# Patient Record
Sex: MALE | Race: WHITE | Employment: FULL TIME | URBAN - METROPOLITAN AREA
[De-identification: names, ages, dates, MRNs, and addresses within clinical notes are randomized per-mention and may not be internally consistent; named-entity substitution may affect disease eponyms.]

---

## 2017-12-28 ENCOUNTER — OFFICE VISIT (OUTPATIENT)
Dept: INTERNAL MEDICINE CLINIC | Age: 37
End: 2017-12-28

## 2017-12-28 VITALS
HEART RATE: 72 BPM | HEIGHT: 71 IN | DIASTOLIC BLOOD PRESSURE: 78 MMHG | WEIGHT: 194 LBS | OXYGEN SATURATION: 99 % | BODY MASS INDEX: 27.16 KG/M2 | SYSTOLIC BLOOD PRESSURE: 120 MMHG | RESPIRATION RATE: 16 BRPM

## 2017-12-28 DIAGNOSIS — Z00.00 PREVENTATIVE HEALTH CARE: Primary | ICD-10-CM

## 2017-12-28 DIAGNOSIS — E78.00 HYPERCHOLESTEREMIA: ICD-10-CM

## 2017-12-28 DIAGNOSIS — Z23 NEED FOR DIPHTHERIA-TETANUS-PERTUSSIS (TDAP) VACCINE: ICD-10-CM

## 2017-12-28 DIAGNOSIS — G56.03 CARPAL TUNNEL SYNDROME, BILATERAL: ICD-10-CM

## 2017-12-28 PROCEDURE — 90715 TDAP VACCINE 7 YRS/> IM: CPT | Performed by: INTERNAL MEDICINE

## 2017-12-28 PROCEDURE — 90471 IMMUNIZATION ADMIN: CPT | Performed by: INTERNAL MEDICINE

## 2017-12-28 PROCEDURE — 99385 PREV VISIT NEW AGE 18-39: CPT | Performed by: INTERNAL MEDICINE

## 2017-12-28 ASSESSMENT — PATIENT HEALTH QUESTIONNAIRE - PHQ9
1. LITTLE INTEREST OR PLEASURE IN DOING THINGS: 0
SUM OF ALL RESPONSES TO PHQ9 QUESTIONS 1 & 2: 0
2. FEELING DOWN, DEPRESSED OR HOPELESS: 0
SUM OF ALL RESPONSES TO PHQ QUESTIONS 1-9: 0

## 2017-12-28 NOTE — PROGRESS NOTES
Kulwinder Rangel  1980 12/28/17    SUBJECTIVE:    Pt with R>L paresthesias at night, wakes the pt at night. Located from the elbow down. He occasionally has some symptoms during the day. This seems to involve his entire hand. He denies any weakness, difficulties with function, though rarely with grasping he will have brief dysesthesias in the 3rd finger. He is using a cock-up wrist splint with significant improvement. Symptoms have been present for a couple of months. High chol - found to have high LDL 12/16. He has not been on meds. OBJECTIVE:    /78   Pulse 72   Resp 16   Ht 5' 11\" (1.803 m)   Wt 194 lb (88 kg) Comment: 185 at home  SpO2 99%   BMI 27.06 kg/m²     Physical Exam   Constitutional: He is oriented to person, place, and time. He appears well-developed and well-nourished. HENT:   Right Ear: External ear normal.   Left Ear: External ear normal.   Mouth/Throat: Oropharynx is clear and moist.   Eyes: Conjunctivae and EOM are normal. Pupils are equal, round, and reactive to light. No scleral icterus. Neck: Neck supple. No JVD present. No tracheal deviation present. No thyromegaly present. Cardiovascular: Normal rate, regular rhythm, normal heart sounds and intact distal pulses. Pulmonary/Chest: Effort normal and breath sounds normal. No respiratory distress. Abdominal: Soft. He exhibits no distension and no mass. There is no hepatosplenomegaly. There is no tenderness. There is no rebound, no guarding and no CVA tenderness. Musculoskeletal: He exhibits no edema. Lymphadenopathy:     He has no cervical adenopathy. Neurological: He is alert and oriented to person, place, and time. He has normal strength. No cranial nerve deficit or sensory deficit. Reflex Scores:       Bicep reflexes are 2+ on the right side and 2+ on the left side. Patellar reflexes are 2+ on the right side and 2+ on the left side. Nonfocal   Skin: No cyanosis. Nails show no clubbing. Psychiatric: He has a normal mood and affect. His behavior is normal. Judgment normal.   very mild atrophy thenar eminence; (-) tinnel's sign      ASSESSMENT:    1. Preventative health care    2. Hypercholesteremia    3. Carpal tunnel syndrome, bilateral    4. Need for diphtheria-tetanus-pertussis (Tdap) vaccine        PLAN:    Vicente Lomas was seen today for establish care. Diagnoses and all orders for this visit:    Preventative health care - BP at goal; check labs; tdap today; declines flu shot; encourage pt to resume exercise  -     Comprehensive Metabolic Panel; Future  -     Lipid Panel; Future  -     HIV Screen    Hypercholesteremia - check labs  -     Comprehensive Metabolic Panel; Future  -     Lipid Panel; Future    Carpal tunnel syndrome, bilateral - will use wrist splints diligently for the next month. If symptoms are not markedly improved will need an EMG and referral to hand surgery  -     TSH with Reflex;  Future    Need for diphtheria-tetanus-pertussis (Tdap) vaccine  -     Tdap (age 6y and older) IM ([address])

## 2018-02-12 ENCOUNTER — OFFICE VISIT (OUTPATIENT)
Dept: INTERNAL MEDICINE CLINIC | Age: 38
End: 2018-02-12

## 2018-02-12 VITALS
WEIGHT: 202 LBS | RESPIRATION RATE: 16 BRPM | HEIGHT: 71 IN | SYSTOLIC BLOOD PRESSURE: 122 MMHG | BODY MASS INDEX: 28.28 KG/M2 | OXYGEN SATURATION: 98 % | HEART RATE: 84 BPM | DIASTOLIC BLOOD PRESSURE: 76 MMHG

## 2018-02-12 DIAGNOSIS — Z00.00 PREVENTATIVE HEALTH CARE: ICD-10-CM

## 2018-02-12 DIAGNOSIS — R20.2 PARESTHESIAS: Primary | ICD-10-CM

## 2018-02-12 LAB
A/G RATIO: 1.9 (ref 1.1–2.2)
ALBUMIN SERPL-MCNC: 4.8 G/DL (ref 3.4–5)
ALP BLD-CCNC: 73 U/L (ref 40–129)
ALT SERPL-CCNC: 30 U/L (ref 10–40)
ANION GAP SERPL CALCULATED.3IONS-SCNC: 12 MMOL/L (ref 3–16)
AST SERPL-CCNC: 25 U/L (ref 15–37)
BILIRUB SERPL-MCNC: 0.6 MG/DL (ref 0–1)
BUN BLDV-MCNC: 14 MG/DL (ref 7–20)
CALCIUM SERPL-MCNC: 9.7 MG/DL (ref 8.3–10.6)
CHLORIDE BLD-SCNC: 97 MMOL/L (ref 99–110)
CHOLESTEROL, TOTAL: 268 MG/DL (ref 0–199)
CO2: 29 MMOL/L (ref 21–32)
CREAT SERPL-MCNC: 0.9 MG/DL (ref 0.9–1.3)
GFR AFRICAN AMERICAN: >60
GFR NON-AFRICAN AMERICAN: >60
GLOBULIN: 2.5 G/DL
GLUCOSE BLD-MCNC: 108 MG/DL (ref 70–99)
HDLC SERPL-MCNC: 52 MG/DL (ref 40–60)
LDL CHOLESTEROL CALCULATED: 199 MG/DL
POTASSIUM SERPL-SCNC: 4.8 MMOL/L (ref 3.5–5.1)
SODIUM BLD-SCNC: 138 MMOL/L (ref 136–145)
TOTAL PROTEIN: 7.3 G/DL (ref 6.4–8.2)
TRIGL SERPL-MCNC: 86 MG/DL (ref 0–150)
TSH REFLEX: 2.46 UIU/ML (ref 0.27–4.2)
VLDLC SERPL CALC-MCNC: 17 MG/DL

## 2018-02-12 PROCEDURE — 99213 OFFICE O/P EST LOW 20 MIN: CPT | Performed by: INTERNAL MEDICINE

## 2018-02-12 NOTE — PROGRESS NOTES
Bartholomew Babinski  1980 02/12/18    SUBJECTIVE:    Pt complains of paresthesias at night, wakes the pt at night. Located from the elbow down. He occasionally has some symptoms during the day. This seems to involve his entire hand. He denies any weakness, difficulties with function, though rarely with grasping he will have brief dysesthesias in the 3rd finger. He is using a cock-up wrist splint with modest improvement. Symptoms have been present for a couple of months. Symptoms are worse in the right than the left charlie. OBJECTIVE:    /76   Pulse 84   Resp 16   Ht 5' 11\" (1.803 m)   Wt 202 lb (91.6 kg)   SpO2 98%   BMI 28.17 kg/m²     Physical Exam    ASSESSMENT:    1. Paresthesias    2. Preventative health care        PLAN:    Darian Morales was seen today for follow-up. Diagnoses and all orders for this visit:    Paresthesias - concerning for bilat carpal tunnel syndrome, and pt continues to have symptoms despite wrist splints. I will ask Dr Christie Worrell to see the pt but bilat upper extremity EMG, and I will send this note to his office. If abnl, I will refer to hand surgery.  [facility]- Fred Jett MD    Preventative health care  -     Comprehensive Metabolic Panel; Future  -     Lipid Panel; Future  -     TSH with Reflex;  Future  -     HIV Screen

## 2018-02-13 LAB
HIV AG/AB: NORMAL
HIV ANTIGEN: NORMAL
HIV-1 ANTIBODY: NORMAL
HIV-2 AB: NORMAL

## 2018-03-07 ENCOUNTER — TELEPHONE (OUTPATIENT)
Dept: INTERNAL MEDICINE CLINIC | Age: 38
End: 2018-03-07

## 2018-03-07 ENCOUNTER — OFFICE VISIT (OUTPATIENT)
Dept: PHYSICAL MEDICINE AND REHAB | Age: 38
End: 2018-03-07

## 2018-03-07 DIAGNOSIS — M79.602 PARESTHESIA AND PAIN OF BOTH UPPER EXTREMITIES: ICD-10-CM

## 2018-03-07 DIAGNOSIS — M79.601 PARESTHESIA AND PAIN OF BOTH UPPER EXTREMITIES: ICD-10-CM

## 2018-03-07 DIAGNOSIS — G56.03 BILATERAL CARPAL TUNNEL SYNDROME: Primary | ICD-10-CM

## 2018-03-07 DIAGNOSIS — G56.22 ULNAR NEUROPATHY AT WRIST, LEFT: ICD-10-CM

## 2018-03-07 DIAGNOSIS — R20.2 PARESTHESIA AND PAIN OF BOTH UPPER EXTREMITIES: ICD-10-CM

## 2018-03-07 PROCEDURE — 95886 MUSC TEST DONE W/N TEST COMP: CPT | Performed by: PHYSICAL MEDICINE & REHABILITATION

## 2018-03-07 PROCEDURE — 95911 NRV CNDJ TEST 9-10 STUDIES: CPT | Performed by: PHYSICAL MEDICINE & REHABILITATION

## 2018-03-07 NOTE — TELEPHONE ENCOUNTER
----- Message from Matt Escobedo MD sent at 3/7/2018  4:40 PM EST -----      ----- Message -----  From: Erma Johnson MA  Sent: 3/7/2018   3:08 PM  To: Matt Escobedo MD

## 2018-03-07 NOTE — TELEPHONE ENCOUNTER
Spoke with patient regarding referral to Dr. Abe Bosworth. Patient stated his discussion with Dr. Terri Yi today made it seem that this was not that severe. Dr. Terri Yi mentioned splinting because this necessarily is not a nerve issue. Patient mentioned that Dr. Carlos Gonzalez said this could be fixed with steroid injection instead of the possibility of surgery? Patient wants to make sure he knows all his options before surgery is the answer. Is this something that Dr. Abe Bosworth would answer or do you firmly believe that surgery is the next step? Please advise.

## 2018-03-07 NOTE — LETTER
March 7, 2018          Iain Schaefer MD  [address]  [place], [address] [place] [address]        Patient Name: Franky Monson   MRN Number: [id number]   YOB: 1980   Date Of Visit: 3/7/2018        Dear Iain Schaefer MD,     Thank you for referring Franky Monson to me for electrodiagnostic testing. Attached are the findings of the EMG and my assessment. If you have any further questions, please do not hesitate to call me. Thank you for this interesting referral.      Sincerely,          PARAS Hickman MD.

## 2018-03-07 NOTE — PROGRESS NOTES
EMG REPORT     CHIEF COMPLAINT: Right forearm and hand pain with digit paresthesias. HISTORY OF PRESENT ILLNESS: 41 y.o. R hand dominant male with about 6 months of abrupt onset R>L hand and forearm pains with numbness, tingling and some clumsiness. He is often awakened with these symptoms. He denies dropping things or feeling weak on the job. No reported rashes or limb discoloration. No significant neck pain. He rated the pain severity as 4-5/10. No similar LE symptoms. No h/o DM or thyroid disorder. PHYSICAL EXAMINATION: Alert. Free and full Cspine active rotation. Neg Spurling's maneuver. Trace/= UE DTR's. Neg Tinel's. 4+/5 right thumb opposition MMT. Normal perception to light touch throughout. No atrophy, tremor or clonus. NERVE CONDUCTION STUDIES:     MOTOR         LATENCY NORMAL AMPLITUDE DISTANCE COND. JULIANNE. RIGHT  MEDIAN 5.6 < 4.2 msec 4.6 8 cm 55   LEFT  MEDIAN 5.0 < 4.2 msec 2.1 8 cm >50   RIGHT  ULNAR 3.4 < 4.2 msec 4.4 8 cm 60   LEFT  ULNAR 3.0 < 4.2 msec 7.5 8 cm >50      SENSORY  ORTHODROMIC        LATENCY NORMAL AMPLITUDE DISTANCE   RIGHT MEDIAN 4.1 <2.3 msec 7 10 cm   LEFT  MEDIAN 2.9 < 2.3 msec 18 10 cm   RIGHT  ULNAR 2.2 < 2.3 msec 13 10 cm   LEFT  ULNAR 2.6 < 2.3 msec 10 10 cm       Right dorsal ulnar sensory: dL 3.2 msec, amp 12 microV. NEEDLE EMG:      RIGHT   LEFT     Insertional Activity Spontaneous  Activity Volutional  MUAP's Insertional Activity Spontaneous  Activity Volutional  MUAP's   Cerv Parasp Normal None Normal Normal None Normal   Infraspinatus Normal None Normal Normal None Normal   Deltoid   Normal None Normal Normal None Normal   Biceps   Normal None Normal Normal None Normal   Triceps   Normal None Normal Normal None Normal   Pronator Teres Normal None Normal Normal None Normal   Extensor Indices Normal None Normal Normal None Normal   1st Dorsal Interosseus Normal None Normal Normal None Normal   Abductor Pollicis Br.  Increased Occ Dec #, Polys Normal None Polys       FINDINGS:   EMG of the cervical paraspinals and both upper limbs demonstrated no paraspinal muscle membrane irritability. No positive waves or fibrillations were identified in proximal musculature. Minimal positive waves and fibrillations were localized to the APB muscle of the right hand. A diminished number of polyphasic motor units were seen in the R>L APB muscle. The median sensory and motor latencies were delayed across both wrists. There was minor left ulnar sensory latency delay across the wrists as well. Ulnar motor conductions were normal.      IMPRESSION:      1. Abnormal EMG. 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      2. 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     3. No evidence of a concurrent spinal nerve root injury (radiculopathy), plexopathy, generalized peripheral neuropathy or primary muscle disease         Thank you for this interesting referral.

## 2018-03-08 DIAGNOSIS — G56.03 BILATERAL CARPAL TUNNEL SYNDROME: Primary | ICD-10-CM

## 2018-09-11 ENCOUNTER — OFFICE VISIT (OUTPATIENT)
Dept: INTERNAL MEDICINE CLINIC | Age: 38
End: 2018-09-11

## 2018-09-11 VITALS
SYSTOLIC BLOOD PRESSURE: 130 MMHG | BODY MASS INDEX: 28.23 KG/M2 | OXYGEN SATURATION: 96 % | HEART RATE: 82 BPM | WEIGHT: 202.4 LBS | DIASTOLIC BLOOD PRESSURE: 78 MMHG | RESPIRATION RATE: 16 BRPM

## 2018-09-11 DIAGNOSIS — M79.672 LEFT FOOT PAIN: Primary | ICD-10-CM

## 2018-09-11 DIAGNOSIS — M79.89 SWELLING OF LEFT FOOT: ICD-10-CM

## 2018-09-11 PROCEDURE — 96372 THER/PROPH/DIAG INJ SC/IM: CPT | Performed by: INTERNAL MEDICINE

## 2018-09-11 PROCEDURE — 99213 OFFICE O/P EST LOW 20 MIN: CPT | Performed by: INTERNAL MEDICINE

## 2018-09-11 RX ORDER — METHYLPREDNISOLONE ACETATE 80 MG/ML
80 INJECTION, SUSPENSION INTRA-ARTICULAR; INTRALESIONAL; INTRAMUSCULAR; SOFT TISSUE ONCE
Status: COMPLETED | OUTPATIENT
Start: 2018-09-11 | End: 2018-09-11

## 2018-09-11 RX ADMIN — METHYLPREDNISOLONE ACETATE 80 MG: 80 INJECTION, SUSPENSION INTRA-ARTICULAR; INTRALESIONAL; INTRAMUSCULAR; SOFT TISSUE at 16:27

## 2018-09-11 NOTE — LETTER
[facility]  [address]  Phone: [phone]  Fax: [phone]    Eamon Cordero MD        September 11, 2018     Patient: Sammy Harper   YOB: 1980   Date of Visit: 9/11/2018       To Whom It May Concern: It is my medical opinion that Sammy Harper be excused 09/11/18. If you have any questions or concerns, please don't hesitate to call.     Sincerely,        Eamon Cordero MD

## 2018-09-11 NOTE — PROGRESS NOTES
Vish Martinez  1980 09/11/18    SUBJECTIVE:    Pt complains of sudden onset of left foot pain on Sunday. There was no trauma. He developed swelling, erythema, tenderness to heat,     He has used ice, heat with some improvement; he took ibuprofen with modest benefit. OBJECTIVE:    /78   Pulse 82   Resp 16   Wt 202 lb 6.4 oz (91.8 kg)   SpO2 96%   BMI 28.23 kg/m²     Physical Exam  Swelling, erythema, and tenderness in the left 1st TMT joint    ASSESSMENT:    1. Left foot pain    2. Swelling of left foot        PLAN:    Edvin Sheriff was seen today for foot pain. Diagnoses and all orders for this visit:    Left foot pain - I suspect gout, but location is atypical. I will check an xray, give a shot of medrol x1 now. Pt will call tomorrow about response. If not improved will refer to podiatry  -     XR ANKLE LEFT (MIN 3 VIEWS); Future  -     methylPREDNISolone acetate (DEPO-MEDROL) injection 80 mg; Inject 1 mL into the muscle once    Swelling of left foot  -     XR ANKLE LEFT (MIN 3 VIEWS);  Future  -     methylPREDNISolone acetate (DEPO-MEDROL) injection 80 mg; Inject 1 mL into the muscle once

## 2018-09-12 ENCOUNTER — TELEPHONE (OUTPATIENT)
Dept: INTERNAL MEDICINE CLINIC | Age: 38
End: 2018-09-12

## 2018-09-12 ENCOUNTER — HOSPITAL ENCOUNTER (OUTPATIENT)
Dept: GENERAL RADIOLOGY | Age: 38
Discharge: HOME | End: 2018-09-12
Attending: INTERNAL MEDICINE | Admitting: INTERNAL MEDICINE

## 2018-09-12 DIAGNOSIS — M79.672 LEFT FOOT PAIN: ICD-10-CM

## 2018-09-12 DIAGNOSIS — M79.89 SWELLING OF LEFT FOOT: ICD-10-CM

## 2018-09-12 NOTE — TELEPHONE ENCOUNTER
Patient called states he was to let Dr Silvia Melara know how he was doing this am. States foot is much better, tender to touch, some brusing, ROM is better. Patient iced foot for 1 hour twice last evening. Later in the evening patient was walking and felt foot pop. Patient had instant relief. Spoke with Dr Silvia Melara he advised patient to have foot xray done. If okay we can see if foot continues to get better if not we will refer patient to Podiatry. Verbal understanding per patient.

## 2018-11-09 ENCOUNTER — OFFICE VISIT (OUTPATIENT)
Dept: INTERNAL MEDICINE CLINIC | Age: 38
End: 2018-11-09
Payer: COMMERCIAL

## 2018-11-09 VITALS
WEIGHT: 202.2 LBS | OXYGEN SATURATION: 96 % | SYSTOLIC BLOOD PRESSURE: 124 MMHG | HEIGHT: 71 IN | HEART RATE: 101 BPM | DIASTOLIC BLOOD PRESSURE: 80 MMHG | BODY MASS INDEX: 28.31 KG/M2

## 2018-11-09 DIAGNOSIS — J18.9 PNEUMONIA OF LEFT LOWER LOBE DUE TO INFECTIOUS ORGANISM: Primary | ICD-10-CM

## 2018-11-09 PROCEDURE — 99213 OFFICE O/P EST LOW 20 MIN: CPT | Performed by: INTERNAL MEDICINE

## 2018-11-09 RX ORDER — DOXYCYCLINE HYCLATE 100 MG
100 TABLET ORAL 2 TIMES DAILY
Qty: 20 TABLET | Refills: 0 | Status: SHIPPED | OUTPATIENT
Start: 2018-11-09 | End: 2018-11-19

## 2018-11-09 RX ORDER — GUAIFENESIN AND CODEINE PHOSPHATE 100; 10 MG/5ML; MG/5ML
5 SOLUTION ORAL 4 TIMES DAILY PRN
Qty: 120 ML | Refills: 0 | Status: SHIPPED | OUTPATIENT
Start: 2018-11-09 | End: 2018-11-16

## 2018-11-12 DIAGNOSIS — R05.9 COUGH: Primary | ICD-10-CM

## 2018-11-13 ENCOUNTER — TELEPHONE (OUTPATIENT)
Dept: INTERNAL MEDICINE CLINIC | Age: 38
End: 2018-11-13

## 2019-02-07 ENCOUNTER — E-VISIT (OUTPATIENT)
Dept: INTERNAL MEDICINE CLINIC | Age: 39
End: 2019-02-07
Payer: COMMERCIAL

## 2019-02-07 DIAGNOSIS — J06.9 VIRAL UPPER RESPIRATORY TRACT INFECTION: Primary | ICD-10-CM

## 2019-02-07 PROCEDURE — 99444 PR PHYSICIAN ONLINE EVALUATION & MANAGEMENT SERVICE: CPT | Performed by: INTERNAL MEDICINE

## 2019-02-07 RX ORDER — BENZONATATE 100 MG/1
100-200 CAPSULE ORAL 3 TIMES DAILY PRN
Qty: 60 CAPSULE | Refills: 0 | Status: SHIPPED | OUTPATIENT
Start: 2019-02-07 | End: 2019-02-14

## 2019-02-07 RX ORDER — FLUTICASONE PROPIONATE 50 MCG
2 SPRAY, SUSPENSION (ML) NASAL DAILY
Qty: 1 BOTTLE | Refills: 0 | Status: SHIPPED | OUTPATIENT
Start: 2019-02-07

## 2019-02-07 ASSESSMENT — LIFESTYLE VARIABLES: SMOKING_STATUS: NO, I'VE NEVER SMOKED

## 2019-02-12 ENCOUNTER — OFFICE VISIT (OUTPATIENT)
Dept: INTERNAL MEDICINE CLINIC | Age: 39
End: 2019-02-12
Payer: COMMERCIAL

## 2019-02-12 VITALS — WEIGHT: 202 LBS | BODY MASS INDEX: 28.19 KG/M2 | HEART RATE: 111 BPM | OXYGEN SATURATION: 97 %

## 2019-02-12 DIAGNOSIS — J01.10 ACUTE NON-RECURRENT FRONTAL SINUSITIS: Primary | ICD-10-CM

## 2019-02-12 PROCEDURE — 99213 OFFICE O/P EST LOW 20 MIN: CPT | Performed by: INTERNAL MEDICINE

## 2019-02-12 RX ORDER — AZITHROMYCIN 250 MG/1
TABLET, FILM COATED ORAL
Qty: 1 PACKET | Refills: 0 | Status: SHIPPED | OUTPATIENT
Start: 2019-02-12 | End: 2021-12-08 | Stop reason: SDUPTHER

## 2020-03-20 ENCOUNTER — OFFICE VISIT (OUTPATIENT)
Dept: INTERNAL MEDICINE CLINIC | Age: 40
End: 2020-03-20
Payer: COMMERCIAL

## 2020-03-20 VITALS
DIASTOLIC BLOOD PRESSURE: 64 MMHG | HEART RATE: 96 BPM | RESPIRATION RATE: 18 BRPM | BODY MASS INDEX: 28.33 KG/M2 | WEIGHT: 203 LBS | SYSTOLIC BLOOD PRESSURE: 122 MMHG | OXYGEN SATURATION: 96 %

## 2020-03-20 PROCEDURE — 99213 OFFICE O/P EST LOW 20 MIN: CPT | Performed by: INTERNAL MEDICINE

## 2020-03-20 RX ORDER — BENZONATATE 100 MG/1
100-200 CAPSULE ORAL 3 TIMES DAILY PRN
Qty: 30 CAPSULE | Refills: 0 | Status: SHIPPED | OUTPATIENT
Start: 2020-03-20 | End: 2020-03-27

## 2020-03-20 RX ORDER — AZITHROMYCIN 250 MG/1
250 TABLET, FILM COATED ORAL SEE ADMIN INSTRUCTIONS
Qty: 6 TABLET | Refills: 0 | Status: SHIPPED | OUTPATIENT
Start: 2020-03-20 | End: 2020-03-25

## 2020-03-20 ASSESSMENT — PATIENT HEALTH QUESTIONNAIRE - PHQ9
1. LITTLE INTEREST OR PLEASURE IN DOING THINGS: 0
SUM OF ALL RESPONSES TO PHQ QUESTIONS 1-9: 0
SUM OF ALL RESPONSES TO PHQ9 QUESTIONS 1 & 2: 0
2. FEELING DOWN, DEPRESSED OR HOPELESS: 0
SUM OF ALL RESPONSES TO PHQ QUESTIONS 1-9: 0

## 2020-03-20 NOTE — PROGRESS NOTES
Marianne [place]  1980 03/20/20    SUBJECTIVE:    Pt complains of 2 weeks sinus congestion, rhinorrhea productive of brown mucous, mild dry cough, frontal sinus tenderness. He denies ear pain, F/C, SOB, wheezing. He has used sudafed with benefit, zyrtec. He had been diagnosed with the flu at the end of last month. OBJECTIVE:    /64   Pulse 96   Resp 18   Wt 203 lb (92.1 kg)   SpO2 96%   BMI 28.33 kg/m²     Physical Exam  Constitutional:       Appearance: He is well-developed. HENT:      Right Ear: External ear normal.      Left Ear: External ear normal.      Nose: Nose normal.      Mouth/Throat:      Pharynx: No oropharyngeal exudate. Eyes:      Conjunctiva/sclera: Conjunctivae normal.   Cardiovascular:      Rate and Rhythm: Normal rate and regular rhythm. Heart sounds: Normal heart sounds. Pulmonary:      Effort: Pulmonary effort is normal.      Breath sounds: Normal breath sounds. Lymphadenopathy:      Cervical: No cervical adenopathy. Neurological:      Mental Status: He is alert. ASSESSMENT:    1. Acute non-recurrent frontal sinusitis        PLAN:    Zuleika Archibald was seen today for cough and congestion. Diagnoses and all orders for this visit:    Acute non-recurrent frontal sinusitis - Tx with sinus rinse, sudafed, azith, and tessalon  -     azithromycin (ZITHROMAX) 250 MG tablet; Take 1 tablet by mouth See Admin Instructions for 5 days 500mg on day 1 followed by 250mg on days 2 - 5  -     benzonatate (TESSALON) 100 MG capsule;  Take 1-2 capsules by mouth 3 times daily as needed for Cough

## 2020-03-24 ENCOUNTER — TELEPHONE (OUTPATIENT)
Dept: INTERNAL MEDICINE CLINIC | Age: 40
End: 2020-03-24

## 2020-03-24 RX ORDER — PREDNISONE 10 MG/1
TABLET ORAL
Qty: 20 TABLET | Refills: 0 | Status: SHIPPED | OUTPATIENT
Start: 2020-03-24 | End: 2021-12-08

## 2021-03-29 ENCOUNTER — HOSPITAL ENCOUNTER (OUTPATIENT)
Age: 41
Setting detail: SPECIMEN
Discharge: HOME | End: 2021-03-29
Payer: COMMERCIAL

## 2021-03-29 DIAGNOSIS — R05.9 COUGH: Primary | ICD-10-CM

## 2021-03-29 PROCEDURE — U0003 INFECTIOUS AGENT DETECTION BY NUCLEIC ACID (DNA OR RNA); SEVERE ACUTE RESPIRATORY SYNDROME CORONAVIRUS 2 (SARS-COV-2) (CORONAVIRUS DISEASE [COVID-19]), AMPLIFIED PROBE TECHNIQUE, MAKING USE OF HIGH THROUGHPUT TECHNOLOGIES AS DESCRIBED BY CMS-2020-01-R: HCPCS

## 2021-03-29 PROCEDURE — U0005 INFEC AGEN DETEC AMPLI PROBE: HCPCS

## 2021-03-30 LAB
SARS-COV-2: NOT DETECTED
SOURCE: NORMAL

## 2021-12-08 ENCOUNTER — TELEMEDICINE (OUTPATIENT)
Dept: INTERNAL MEDICINE CLINIC | Age: 41
End: 2021-12-08
Payer: COMMERCIAL

## 2021-12-08 DIAGNOSIS — J01.10 ACUTE NON-RECURRENT FRONTAL SINUSITIS: Primary | ICD-10-CM

## 2021-12-08 PROCEDURE — 99213 OFFICE O/P EST LOW 20 MIN: CPT | Performed by: INTERNAL MEDICINE

## 2021-12-08 RX ORDER — AZITHROMYCIN 250 MG/1
TABLET, FILM COATED ORAL
Qty: 1 PACKET | Refills: 0 | Status: SHIPPED | OUTPATIENT
Start: 2021-12-08 | End: 2022-04-06

## 2021-12-08 ASSESSMENT — PATIENT HEALTH QUESTIONNAIRE - PHQ9
2. FEELING DOWN, DEPRESSED OR HOPELESS: 0
SUM OF ALL RESPONSES TO PHQ QUESTIONS 1-9: 0
1. LITTLE INTEREST OR PLEASURE IN DOING THINGS: 0
SUM OF ALL RESPONSES TO PHQ9 QUESTIONS 1 & 2: 0

## 2021-12-08 NOTE — PROGRESS NOTES
2021    TELEHEALTH EVALUATION -- Audio/Visual (During GWAVD-49 public health emergency)    HPI:    Darling Morse (:  1980) has requested an audio/video evaluation for the following concern(s):    Pt complains of nasal congestion, sinus pressure, dry cough, frontal sinus pain, nasal congestion, diminished sens of smell. He denies any ear pain, rhinorrhea, SOB, wheezing, N/V, diarrhea. He has used saline irrigation, jahaira seltzer, mucinex,     Review of Systems    Prior to Visit Medications    Medication Sig Taking? Authorizing Provider   azithromycin (ZITHROMAX Z-SETH) 250 MG tablet Take 2 tablets (500 mg) on Day 1, and then take 1 tablet (250 mg) on days 2 through 5. Yes Jorge Sinha MD   fluticasone [place]) 50 MCG/ACT nasal spray 2 sprays by Each Nare route daily  Patient not taking: Reported on 2021  Jorge Sinha MD   DM-Phenylephrine-Acetaminophen ([place]) 20- MG PACK Take by mouth  Patient not taking: Reported on 2021  Historical Provider, MD       Social History     Tobacco Use    Smoking status: Never Smoker    Smokeless tobacco: Never Used    Tobacco comment: Cigar from time to time   Substance Use Topics    Alcohol use: Yes     Alcohol/week: 10.0 standard drinks     Types: 10 Shots of liquor per week    Drug use: No          PHYSICAL EXAMINATION:    Constitutional: [x] Appears well-developed and well-nourished [x] No apparent distress      [] Abnormal-   Mental status  [x] Alert and awake  [x] Oriented to person/place/time [x]Able to follow commands             Psychiatric:       [x] Normal Affect [x] No Hallucinations       ASSESSMENT/PLAN:  1. Acute non-recurrent frontal sinusitis - encourage pt to get checked for covid; encourage vaccine; will Tx likely sinusitis with azith and OTC meds  - azithromycin (ZITHROMAX Z-SETH) 250 MG tablet; Take 2 tablets (500 mg) on Day 1, and then take 1 tablet (250 mg) on days 2 through 5. Dispense: 1 packet; Refill: 0      No follow-ups on file. Luz Quan, was evaluated through a synchronous (real-time) audio-video encounter. The patient (or guardian if applicable) is aware that this is a billable service. Verbal consent to proceed has been obtained within the past 12 months. The visit was conducted pursuant to the emergency declaration under the [address] [place] [address] and the [employer] and "[employer] General Act. Patient identification was verified, and a caregiver was present when appropriate. The patient was located in a state where the provider was credentialed to provide care. Total time spent on this encounter: Not billed by time    --Shivani Burgos MD on 12/8/2021 at 2:44 PM    An electronic signature was used to authenticate this note.

## 2022-04-06 ENCOUNTER — TELEMEDICINE (OUTPATIENT)
Dept: INTERNAL MEDICINE CLINIC | Age: 42
End: 2022-04-06
Payer: COMMERCIAL

## 2022-04-06 DIAGNOSIS — E78.00 HIGH CHOLESTEROL: Primary | ICD-10-CM

## 2022-04-06 PROCEDURE — 99213 OFFICE O/P EST LOW 20 MIN: CPT | Performed by: INTERNAL MEDICINE

## 2022-04-06 RX ORDER — ROSUVASTATIN CALCIUM 5 MG/1
5 TABLET, COATED ORAL NIGHTLY
Qty: 30 TABLET | Refills: 5 | Status: SHIPPED | OUTPATIENT
Start: 2022-04-06 | End: 2022-04-29

## 2022-04-06 NOTE — PROGRESS NOTES
2022    TELEHEALTH EVALUATION -- Audio/Visual (During KDIDD-25 public health emergency)    HPI:    Oz Rosado (:  1980) has requested an audio/video evaluation for the following concern(s):    Pt recently had labs done. tchol 259; HDL 47; trig 99; ; glucose 106; a1c 5.2 - labs were not fasting. /90; BMI 29; waist 35    He is exercising 3-5 times weekly - lifting    Review of Systems    Prior to Visit Medications    Medication Sig Taking? Authorizing Provider   rosuvastatin (CRESTOR) 5 MG tablet Take 1 tablet by mouth nightly Yes Suleiman Tinajero MD   fluticasone [facility]) 50 MCG/ACT nasal spray 2 sprays by Each Nare route daily  Patient not taking: Reported on 2021  Suleiman Tinajero MD       Social History     Tobacco Use    Smoking status: Never Smoker    Smokeless tobacco: Never Used    Tobacco comment: Cigar from time to time   Substance Use Topics    Alcohol use: Yes     Alcohol/week: 10.0 standard drinks     Types: 10 Shots of liquor per week    Drug use: No            PHYSICAL EXAMINATION:  Constitutional: [x] Appears well-developed and well-nourished [x] No apparent distress      [] Abnormal-   Mental status  [x] Alert and awake  [x] Oriented to person/place/time [x]Able to follow commands             Psychiatric:       [x] Normal Affect [x] No Hallucinations      ASSESSMENT/PLAN:  1. High cholesterol - discussed very elevated LDL at length. Likely familial. Given that this has been present his entire adulthood I strongly encourage statin, though discussed lifestyle changes. Will start cresor 5mg daily, check labs 3 month    - Comprehensive Metabolic Panel; Future  - Lipid Panel; Future  - Hemoglobin A1C; Future      Return in about 3 months (around 2022). Oz Rosado, was evaluated through a synchronous (real-time) audio-video encounter.  The patient (or guardian if applicable) is aware that this is a billable service, which includes applicable co-pays. This Virtual Visit was conducted with patient's (and/or legal guardian's) consent. The visit was conducted pursuant to the emergency declaration under the [address] [place] [address] and the [employer] and [employer] General Act. Patient identification was verified, and a caregiver was present when appropriate. The patient was located at home in a state where the provider was licensed to provide care. Total time spent on this encounter: Not billed by time    --Dionna Marquez MD on 4/6/2022 at 4:37 PM    An electronic signature was used to authenticate this note.

## 2022-04-29 RX ORDER — ROSUVASTATIN CALCIUM 5 MG/1
5 TABLET, COATED ORAL NIGHTLY
Qty: 30 TABLET | Refills: 5 | Status: SHIPPED | OUTPATIENT
Start: 2022-04-29

## 2022-07-08 ENCOUNTER — OFFICE VISIT (OUTPATIENT)
Dept: INTERNAL MEDICINE CLINIC | Age: 42
End: 2022-07-08
Payer: COMMERCIAL

## 2022-07-08 VITALS
DIASTOLIC BLOOD PRESSURE: 74 MMHG | HEART RATE: 74 BPM | BODY MASS INDEX: 29.47 KG/M2 | RESPIRATION RATE: 12 BRPM | SYSTOLIC BLOOD PRESSURE: 132 MMHG | WEIGHT: 211.2 LBS | OXYGEN SATURATION: 97 %

## 2022-07-08 DIAGNOSIS — Z00.00 ENCOUNTER FOR PREVENTIVE CARE: Primary | ICD-10-CM

## 2022-07-08 DIAGNOSIS — E78.00 HIGH CHOLESTEROL: ICD-10-CM

## 2022-07-08 PROCEDURE — 99396 PREV VISIT EST AGE 40-64: CPT | Performed by: INTERNAL MEDICINE

## 2022-07-08 ASSESSMENT — PATIENT HEALTH QUESTIONNAIRE - PHQ9
SUM OF ALL RESPONSES TO PHQ QUESTIONS 1-9: 0
1. LITTLE INTEREST OR PLEASURE IN DOING THINGS: 0
SUM OF ALL RESPONSES TO PHQ QUESTIONS 1-9: 0
SUM OF ALL RESPONSES TO PHQ QUESTIONS 1-9: 0
SUM OF ALL RESPONSES TO PHQ9 QUESTIONS 1 & 2: 0
2. FEELING DOWN, DEPRESSED OR HOPELESS: 0
SUM OF ALL RESPONSES TO PHQ QUESTIONS 1-9: 0

## 2022-07-08 NOTE — PROGRESS NOTES
Alejandro Mckinney  1980 07/08/22    SUBJECTIVE:      Pt continues to have regular exercise - is doing more cardio but still lifting. He has increased fruits and vegetables, decreased salt, processed foods. He is more vigilant with taken his meds. OBJECTIVE:    /74   Pulse 74   Resp 12   Wt 211 lb 3.2 oz (95.8 kg)   SpO2 97%   BMI 29.47 kg/m²     Physical Exam  Constitutional:       Appearance: He is well-developed. Eyes:      General: No scleral icterus. Conjunctiva/sclera: Conjunctivae normal.   Neck:      Thyroid: No thyromegaly. Vascular: No JVD. Trachea: No tracheal deviation. Cardiovascular:      Rate and Rhythm: Normal rate and regular rhythm. Heart sounds: Normal heart sounds. No murmur heard. Pulmonary:      Effort: Pulmonary effort is normal. No respiratory distress. Breath sounds: Normal breath sounds. No wheezing. Abdominal:      General: There is no distension. Palpations: Abdomen is soft. There is no mass. Tenderness: There is no abdominal tenderness. There is no guarding or rebound. Musculoskeletal:      Cervical back: Neck supple. Lymphadenopathy:      Cervical: No cervical adenopathy. Skin:     Nails: There is no clubbing. Neurological:      Mental Status: He is alert and oriented to person, place, and time. Comments: Nonfocal   Psychiatric:         Behavior: Behavior normal.         Judgment: Judgment normal.         ASSESSMENT:    1. Encounter for preventive care    2. High cholesterol        PLAN:    Keily Bernstein was seen today for 3 month follow-up. Diagnoses and all orders for this visit:    Encounter for preventive care - check labs; BP at goal; cont exercise; encourage covid vaccine; up to date on tdap  -     Lipid Panel; Future  -     Comprehensive Metabolic Panel; Future  -     Hemoglobin A1C; Future  -     Hepatitis C Antibody;  Future    High cholesterol - check labs

## 2022-11-16 RX ORDER — ROSUVASTATIN CALCIUM 5 MG/1
5 TABLET, COATED ORAL NIGHTLY
Qty: 90 TABLET | Refills: 1 | Status: SHIPPED | OUTPATIENT
Start: 2022-11-16

## 2023-03-15 ENCOUNTER — TELEMEDICINE (OUTPATIENT)
Dept: INTERNAL MEDICINE CLINIC | Age: 43
End: 2023-03-15
Payer: COMMERCIAL

## 2023-03-15 DIAGNOSIS — K52.9 GASTROENTERITIS: Primary | ICD-10-CM

## 2023-03-15 PROCEDURE — 99213 OFFICE O/P EST LOW 20 MIN: CPT | Performed by: INTERNAL MEDICINE

## 2023-03-15 SDOH — ECONOMIC STABILITY: TRANSPORTATION INSECURITY
IN THE PAST 12 MONTHS, HAS LACK OF TRANSPORTATION KEPT YOU FROM MEETINGS, WORK, OR FROM GETTING THINGS NEEDED FOR DAILY LIVING?: PATIENT DECLINED

## 2023-03-15 SDOH — ECONOMIC STABILITY: INCOME INSECURITY: HOW HARD IS IT FOR YOU TO PAY FOR THE VERY BASICS LIKE FOOD, HOUSING, MEDICAL CARE, AND HEATING?: PATIENT DECLINED

## 2023-03-15 SDOH — ECONOMIC STABILITY: HOUSING INSECURITY
IN THE LAST 12 MONTHS, WAS THERE A TIME WHEN YOU DID NOT HAVE A STEADY PLACE TO SLEEP OR SLEPT IN A SHELTER (INCLUDING NOW)?: PATIENT REFUSED

## 2023-03-15 SDOH — ECONOMIC STABILITY: FOOD INSECURITY: WITHIN THE PAST 12 MONTHS, THE FOOD YOU BOUGHT JUST DIDN'T LAST AND YOU DIDN'T HAVE MONEY TO GET MORE.: PATIENT DECLINED

## 2023-03-15 SDOH — ECONOMIC STABILITY: FOOD INSECURITY: WITHIN THE PAST 12 MONTHS, YOU WORRIED THAT YOUR FOOD WOULD RUN OUT BEFORE YOU GOT MONEY TO BUY MORE.: PATIENT DECLINED

## 2023-03-15 ASSESSMENT — PATIENT HEALTH QUESTIONNAIRE - PHQ9
SUM OF ALL RESPONSES TO PHQ QUESTIONS 1-9: 0
1. LITTLE INTEREST OR PLEASURE IN DOING THINGS: 0
SUM OF ALL RESPONSES TO PHQ QUESTIONS 1-9: 0
SUM OF ALL RESPONSES TO PHQ9 QUESTIONS 1 & 2: 0
2. FEELING DOWN, DEPRESSED OR HOPELESS: 0

## 2023-03-15 NOTE — PROGRESS NOTES
3/15/2023    TELEHEALTH EVALUATION -- Audio/Visual (During VWP-20 public health emergency)    HPI:    Eladio Whitehead (:  1980) has requested an audio/video evaluation for the following concern(s):    Pt with diarrhea, HA, myalgias starting last night. He has diarrhea x5 productive brown liquid stool without blood. He has low grade fever. He denies abd pain, recent abx. .     He has treated symptoms with ibuprofen and fluids. He has been feeling better today. Daughter and son have had a similar illness    Review of Systems    Prior to Visit Medications    Medication Sig Taking? Authorizing Provider   rosuvastatin (CRESTOR) 5 MG tablet TAKE 1 TABLET BY MOUTH NIGHTLY Yes Baltazar Gordillo MD       Social History     Tobacco Use    Smoking status: Never    Smokeless tobacco: Never    Tobacco comments:     Cigar from time to time   Substance Use Topics    Alcohol use: Yes     Alcohol/week: 10.0 standard drinks     Types: 10 Shots of liquor per week    Drug use: No            PHYSICAL EXAMINATION:  nontoxic    ASSESSMENT/PLAN:  1. Gastroenteritis - likely norovirus (in kids' school system). Symptoms already improving. Avoid ibuprofen. Use tylenol, imodium PRN. Push fluids      No follow-ups on file. Eladio Whitehead, was evaluated through a synchronous (real-time) audio-video encounter. The patient (or guardian if applicable) is aware that this is a billable service, which includes applicable co-pays. This Virtual Visit was conducted with patient's (and/or legal guardian's) consent. The visit was conducted pursuant to the emergency declaration under the [facility], [address] [place] [address] authority and the [employer] and [employer] General Act. Patient identification was verified, and a caregiver was present when appropriate. The patient was located at Home: [address] [facility] [phone]  Provider was located at [address] Dept): [address]  [facility],  [address] [place] [address]        Total time spent on this encounter: Not billed by time    --Jen Sanches MD on 3/15/2023 at 3:53 PM    An electronic signature was used to authenticate this note.

## 2024-10-25 ENCOUNTER — HOSPITAL ENCOUNTER (OUTPATIENT)
Dept: SLEEP CENTER | Age: 44
Discharge: HOME | End: 2024-10-25
Payer: OTHER GOVERNMENT

## 2024-10-25 VITALS
HEART RATE: 80 BPM | OXYGEN SATURATION: 96 % | DIASTOLIC BLOOD PRESSURE: 83 MMHG | WEIGHT: 205 LBS | SYSTOLIC BLOOD PRESSURE: 142 MMHG | BODY MASS INDEX: 27.77 KG/M2 | HEIGHT: 72 IN

## 2024-10-25 DIAGNOSIS — F51.01 PRIMARY INSOMNIA: ICD-10-CM

## 2024-10-25 DIAGNOSIS — G47.10 HYPERSOMNOLENCE: Primary | ICD-10-CM

## 2024-10-25 PROBLEM — G47.00 INSOMNIA: Status: ACTIVE | Noted: 2024-10-25

## 2024-10-25 PROCEDURE — 99211 OFF/OP EST MAY X REQ PHY/QHP: CPT

## 2024-10-25 ASSESSMENT — SLEEP AND FATIGUE QUESTIONNAIRES
HOW LIKELY ARE YOU TO NOD OFF OR FALL ASLEEP WHILE SITTING AND READING: MODERATE CHANCE OF DOZING
ESS TOTAL SCORE: 11
HOW LIKELY ARE YOU TO NOD OFF OR FALL ASLEEP IN A CAR, WHILE STOPPED FOR A FEW MINUTES IN TRAFFIC: SLIGHT CHANCE OF DOZING
HOW LIKELY ARE YOU TO NOD OFF OR FALL ASLEEP WHILE SITTING QUIETLY AFTER LUNCH WITHOUT ALCOHOL: SLIGHT CHANCE OF DOZING
HOW LIKELY ARE YOU TO NOD OFF OR FALL ASLEEP WHEN YOU ARE A PASSENGER IN A CAR FOR AN HOUR WITHOUT A BREAK: MODERATE CHANCE OF DOZING
HOW LIKELY ARE YOU TO NOD OFF OR FALL ASLEEP WHILE SITTING INACTIVE IN A PUBLIC PLACE: MODERATE CHANCE OF DOZING
HOW LIKELY ARE YOU TO NOD OFF OR FALL ASLEEP WHILE SITTING AND TALKING TO SOMEONE: WOULD NEVER DOZE
HOW LIKELY ARE YOU TO NOD OFF OR FALL ASLEEP WHILE WATCHING TV: SLIGHT CHANCE OF DOZING
HOW LIKELY ARE YOU TO NOD OFF OR FALL ASLEEP WHILE LYING DOWN TO REST IN THE AFTERNOON WHEN CIRCUMSTANCES PERMIT: MODERATE CHANCE OF DOZING

## 2024-10-25 NOTE — PROGRESS NOTES
[facility]      Ky Vital MD, FACP, [facility]  Kevyn Perla MD, [facility]  Jose Templeton MD, [facility]  Ledy Espinosa DO  Anna Bearden DO      [address]  [address]  [place] [address]   PH: [phone]  F: [phone]     Subjective:     Patient ID: Kerwin Kingsley is a 44 y.o. male, referred to the sleep center for consultation with a sleep specialist.     Reason for Consultation/Chief Complaint:   Chief Complaint   Patient presents with    G47.8       Referring physician:  [facility].    Symptoms:   []  Snoring                                                               []  Dry Mouth  [x]  Choking                                                              []  Morning Headaches  [x]  Gasping for Air                                                   [x]  Trouble Falling asleep  [x]  Tired during the daytime                                    [x]  Trouble Staying Asleep  [x]  Tired when you wake up                                    []  Weight Gain in Last 5 Years  [x]  Wake up frequently at night                              []  Weight Loss in Last 5 Years  []  Shortness Of Breath                                          []  Shift Worker  []  Coughing                                                            []  Smoker (Previous or Current)  []  Chest Pain                                                          []  Anxiety  []  Trouble keeping your legs still at night              []  Depression  []  Kicking your legs in your sleep                         [x]  Insomnia            []  Other:     Significant Co-morbidities:  []  Congestive Heart Failure     []  COPD         []  Stroke (Past 30 Days)      []  Supplemental Oxygen Usage       []  Cognitive Impairment      []  Neuromuscular Problems  []  Epilepsy/Neurological Disorders         Duration of Sleep Problems:    History:    Social History     Socioeconomic History    Marital status:

## 2024-12-02 ENCOUNTER — HOSPITAL ENCOUNTER (OUTPATIENT)
Dept: SLEEP CENTER | Age: 44
Discharge: HOME | End: 2024-12-02
Attending: INTERNAL MEDICINE
Payer: OTHER GOVERNMENT

## 2024-12-02 DIAGNOSIS — G47.10 HYPERSOMNOLENCE: ICD-10-CM

## 2024-12-02 PROCEDURE — G0399 HOME SLEEP TEST/TYPE 3 PORTA: HCPCS

## 2024-12-02 NOTE — PROGRESS NOTES
Kerwin Kingsley  1980  [facility] at [facility] on 12[facility]2/2024 for set up and instruction of home sleep study with the [facility] unit.  he was instructed on proper set-up and operation of HST unit. Written instructions with set up diagram given for reference and reinforcement of verbal instruction and demonstration. he was able to return demonstration appropriately. No home environment, vision, dexterity, comprehension concerns with this patient based on completed forms and patient interactions. Patient will return unit after one night as instructed.    Electronically signed by Giles Trinh on 12/2/2024 at 3:22 PM

## 2024-12-27 ENCOUNTER — HOSPITAL ENCOUNTER (OUTPATIENT)
Dept: SLEEP CENTER | Age: 44
Discharge: HOME | End: 2024-12-27
Payer: OTHER GOVERNMENT

## 2024-12-27 DIAGNOSIS — G47.10 HYPERSOMNOLENCE: Primary | ICD-10-CM

## 2024-12-27 PROCEDURE — 99211 OFF/OP EST MAY X REQ PHY/QHP: CPT

## 2024-12-27 NOTE — PROGRESS NOTES
[facility]      Ky Vital MD, FACP, [facility]  Kevyn Perla MD, [facility]  Jose Templeton MD, [facility]  Ledy Espinosa DO  Anna Bearden DO      [address]  [address]  [place] [address]   PH: [phone]  F: [phone]     Subjective:     Patient ID: Kerwin Kingsley is a 44 y.o. male, following up today with the sleep center.     Reason for Follow Up/Chief Complaint:   Chief Complaint   Patient presents with    2 Week Follow-Up    Daytime Sleepiness    Snoring       Results:HST:AHI 3. RDI 14.    History: C/O EDS.    Social History     Socioeconomic History    Marital status:      Spouse name: Not on file    Number of children: Not on file    Years of education: Not on file    Highest education level: Not on file   Occupational History    Occupation:    Tobacco Use    Smoking status: Never    Smokeless tobacco: Never    Tobacco comments:     Cigar from time to time   Substance and Sexual Activity    Alcohol use: Yes     Alcohol/week: 10.0 standard drinks of alcohol     Types: 10 Shots of liquor per week    Drug use: No    Sexual activity: Yes     Partners: Female   Other Topics Concern    Not on file   Social History Narrative    Exercise - lifting nightly     Social Determinants of Health     Financial Resource Strain: Not on file   Food Insecurity: Not on file (3/15/2023)   Transportation Needs: Not on file   Physical Activity: Not on file   Stress: Not on file   Social Connections: Not on file   Intimate Partner Violence: Not on file   Housing Stability: Not on file       Prior to Admission medications    Medication Sig Start Date End Date Taking? Authorizing Provider   rosuvastatin (CRESTOR) 5 MG tablet TAKE 1 TABLET BY MOUTH NIGHTLY 11/16/22  Yes Nir Vázquez MD       Allergies as of 12/27/2024 - Fully Reviewed 12/27/2024   Allergen Reaction Noted    Penicillins  12/28/2017       Patient Active Problem List   Diagnosis    High cholesterol

## 2025-02-11 ENCOUNTER — HOSPITAL ENCOUNTER (OUTPATIENT)
Dept: SLEEP CENTER | Age: 45
Discharge: HOME | End: 2025-02-11
Payer: OTHER GOVERNMENT

## 2025-02-11 VITALS — BODY MASS INDEX: 27.77 KG/M2 | WEIGHT: 205 LBS | HEIGHT: 72 IN

## 2025-02-11 DIAGNOSIS — G47.10 HYPERSOMNOLENCE: ICD-10-CM

## 2025-02-11 PROCEDURE — 95810 POLYSOM 6/> YRS 4/> PARAM: CPT

## 2025-02-12 NOTE — PROGRESS NOTES
2/12/2025  sleep study  for Kerwin Kingsley  1980 is complete.      Results are pending physician review.    Electronically signed by Leif Macias on 2/12/2025 at 5:40 AM

## 2025-02-21 ENCOUNTER — HOSPITAL ENCOUNTER (OUTPATIENT)
Dept: SLEEP CENTER | Age: 45
Discharge: HOME | End: 2025-02-21
Payer: OTHER GOVERNMENT

## 2025-02-21 DIAGNOSIS — G47.33 OSA (OBSTRUCTIVE SLEEP APNEA): Primary | ICD-10-CM

## 2025-02-21 PROCEDURE — 99211 OFF/OP EST MAY X REQ PHY/QHP: CPT

## 2025-02-21 NOTE — PROGRESS NOTES
[facility]      Ky Vital MD, FACP, [facility]  Kevyn Perla MD, [facility]  Jose Templeton MD, [facility]  Ledy Espinosa DO  Anna Bearden DO      [address]  [address]  [place] [address]   PH: [phone]  F: [phone]     Subjective:     Patient ID: Kerwin Kingsley is a 45 y.o. male, following up today with the sleep center.     Reason for Follow Up/Chief Complaint: No chief complaint on file.      Results:Sleep study:Mild SERINA.Nocturnal desat.    History: EDS,Insomnia.    Social History     Socioeconomic History    Marital status:      Spouse name: Not on file    Number of children: Not on file    Years of education: Not on file    Highest education level: Not on file   Occupational History    Occupation:    Tobacco Use    Smoking status: Never    Smokeless tobacco: Never    Tobacco comments:     Cigar from time to time   Substance and Sexual Activity    Alcohol use: Yes     Alcohol/week: 10.0 standard drinks of alcohol     Types: 10 Shots of liquor per week    Drug use: No    Sexual activity: Yes     Partners: Female   Other Topics Concern    Not on file   Social History Narrative    Exercise - lifting nightly     Social Determinants of Health     Financial Resource Strain: Not on file   Food Insecurity: Not on file (3/15/2023)   Transportation Needs: Not on file   Physical Activity: Not on file   Stress: Not on file   Social Connections: Not on file   Intimate Partner Violence: Not on file   Housing Stability: Not on file       Prior to Admission medications    Medication Sig Start Date End Date Taking? Authorizing Provider   rosuvastatin (CRESTOR) 5 MG tablet TAKE 1 TABLET BY MOUTH NIGHTLY 11/16/22   Nir Vázquez MD       Allergies as of 02/21/2025 - Fully Reviewed 02/21/2025   Allergen Reaction Noted    Penicillins  12/28/2017       Patient Active Problem List   Diagnosis    High cholesterol    Hypersomnolence    Insomnia    SERINA